# Patient Record
Sex: FEMALE | Race: ASIAN | Employment: FULL TIME | URBAN - METROPOLITAN AREA
[De-identification: names, ages, dates, MRNs, and addresses within clinical notes are randomized per-mention and may not be internally consistent; named-entity substitution may affect disease eponyms.]

---

## 2023-01-03 ENCOUNTER — TELEPHONE (OUTPATIENT)
Dept: ONCOLOGY | Age: 38
End: 2023-01-03

## 2023-01-03 ENCOUNTER — INITIAL CONSULT (OUTPATIENT)
Dept: ONCOLOGY | Age: 38
End: 2023-01-03
Payer: MEDICAID

## 2023-01-03 VITALS
HEART RATE: 79 BPM | WEIGHT: 119.7 LBS | TEMPERATURE: 98.3 F | DIASTOLIC BLOOD PRESSURE: 65 MMHG | SYSTOLIC BLOOD PRESSURE: 125 MMHG

## 2023-01-03 DIAGNOSIS — D50.9 MICROCYTIC ANEMIA: Primary | ICD-10-CM

## 2023-01-03 PROCEDURE — G8427 DOCREV CUR MEDS BY ELIG CLIN: HCPCS | Performed by: INTERNAL MEDICINE

## 2023-01-03 PROCEDURE — G8421 BMI NOT CALCULATED: HCPCS | Performed by: INTERNAL MEDICINE

## 2023-01-03 PROCEDURE — 99243 OFF/OP CNSLTJ NEW/EST LOW 30: CPT | Performed by: INTERNAL MEDICINE

## 2023-01-03 PROCEDURE — G8484 FLU IMMUNIZE NO ADMIN: HCPCS | Performed by: INTERNAL MEDICINE

## 2023-01-03 RX ORDER — SPIRONOLACTONE 50 MG/1
TABLET, FILM COATED ORAL
COMMUNITY
Start: 2022-12-26

## 2023-01-03 RX ORDER — LEVOTHYROXINE SODIUM 0.05 MG/1
TABLET ORAL
COMMUNITY
Start: 2022-12-26

## 2023-01-03 NOTE — PROGRESS NOTES
DIAGNOSIS:   History of thalassemia minor  Component of iron deficiency anemia leading to microcytic anemia  CURRENT THERAPY:  Folic acid supplementation  Iron as needed  BRIEF CASE HISTORY:   Lea Bateman is a very pleasant 40 y.o. female who is referred to us for microcytic anemia. She has an underlying history of thalassemia minor that she knew about since childhood. She has been taking folic acid supplementation. More recently, her hemoglobin dropped from a baseline of 13 down to 11. MCV dropped to about 83. The patient started taking iron and hemoglobin quickly improved up to 13 and MCV up to 93. She is sent to us for a consultation, she has no symptoms specially no fatigue, no chest pain no shortness of breath, no nausea or vomiting, her menstrual periods are not regular and not particularly severe    PAST MEDICAL HISTORY: has a past medical history of Anemia and Thyroid disease. PAST SURGICAL HISTORY: has no past surgical history on file. CURRENT MEDICATIONS:  has a current medication list which includes the following prescription(s): spironolactone and levothyroxine. ALLERGIES:  has No Known Allergies. FAMILY HISTORY: Negative for any hematological or oncological conditions. SOCIAL HISTORY:  reports that she has never smoked. She has never used smokeless tobacco. She reports that she does not drink alcohol and does not use drugs. REVIEW OF SYSTEMS:   General: no fever or night sweats, Weight is stable. ENT: No double or blurred vision, no tinnitus or hearing problem, no dysphagia or sore throat   Respiratory: No chest pain, no shortness of breath, no cough or hemoptysis. Cardiovascular: Denies chest pain, PND or orthopnea. No L E swelling or palpitations. Gastrointestinal:    No nausea or vomiting, abdominal pain, diarrhea or constipation. Genitourinary: Denies dysuria, hematuria, frequency, urgency or incontinence.     Neurological: Denies headaches, decreased LOC, no sensory or motor focal deficits. Musculoskeletal:  No arthralgia no back pain or joint swelling. Skin: There are no rashes or bleeding. Psychiatric:  No anxiety, no depression. Endocrine: no diabetes or thyroid disease. Hematologic: no bleeding , no adenopathy. PHYSICAL EXAM: Shows a well appearing 40y.o.-year-old female who is not in pain or distress. Vital Signs: Blood pressure 125/65, pulse 79, temperature 98.3 °F (36.8 °C), temperature source Temporal, weight 119 lb 11.2 oz (54.3 kg). HEENT: Normocephalic and atraumatic. Pupils are equal, round, reactive to light and accommodation. Extraocular muscles are intact. Neck: Showed no JVD, no carotid bruit . Lungs: Clear to auscultation bilaterally. Heart: Regular without any murmur. Abdomen: Soft, nontender. No hepatosplenomegaly. Extremities: Lower extremities show no edema, clubbing, or cyanosis. Breasts: Examination not done today. Neuro exam: intact cranial nerves bilaterally no motor or sensory deficit, gait is normal. Lymphatic: no adenopathy appreciated in the supraclavicular, axillary, cervical or inguinal area    REVIEW OF LABORATORY DATA:   Chemistry panel is without any particular abnormality  CBC in June showed white count of 8 hemoglobin 11.5 MCV 82 platelets are 593, iron studies showed iron saturation of 5%  CBC in December showed white count of 7 hemoglobin 13.6 MCV 93.1 and platelets are 043  REVIEW OF RADIOLOGICAL RESULTS:     IMPRESSION:   The patient has mild anemia which is a combination of underlying thalassemia minor and iron deficiency  CBC completely normalized with folic acid and iron supplementation  PLAN:   The patient was reassured. I spent most of the visit today explaining the pathology and pathophysiology of anemia due to iron deficiency as well as anemia due to thalassemia minor.   The  patient is completely asymptomatic and latest CBC is normal  I advised iron supplementation during her periods and folic acid supplementation continuously  Obviously if she is planning to get pregnant, her  needs rechecked for thalassemia minor  Return as needed